# Patient Record
Sex: MALE | Race: WHITE | NOT HISPANIC OR LATINO | URBAN - METROPOLITAN AREA
[De-identification: names, ages, dates, MRNs, and addresses within clinical notes are randomized per-mention and may not be internally consistent; named-entity substitution may affect disease eponyms.]

---
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
## 2024-03-27 ENCOUNTER — HOSPITAL ENCOUNTER (OUTPATIENT)
Dept: RADIOLOGY | Facility: MEDICAL CENTER | Age: 19
End: 2024-03-27
Attending: NURSE PRACTITIONER
Payer: COMMERCIAL

## 2024-03-27 ENCOUNTER — OCCUPATIONAL MEDICINE (OUTPATIENT)
Dept: URGENT CARE | Facility: PHYSICIAN GROUP | Age: 19
End: 2024-03-27
Payer: COMMERCIAL

## 2024-03-27 VITALS
HEIGHT: 69 IN | DIASTOLIC BLOOD PRESSURE: 72 MMHG | RESPIRATION RATE: 16 BRPM | WEIGHT: 178 LBS | OXYGEN SATURATION: 99 % | HEART RATE: 84 BPM | BODY MASS INDEX: 26.36 KG/M2 | SYSTOLIC BLOOD PRESSURE: 126 MMHG | TEMPERATURE: 97.9 F

## 2024-03-27 DIAGNOSIS — S93.402A SPRAIN OF LEFT ANKLE, UNSPECIFIED LIGAMENT, INITIAL ENCOUNTER: ICD-10-CM

## 2024-03-27 DIAGNOSIS — S99.912A INJURY OF LEFT ANKLE, INITIAL ENCOUNTER: ICD-10-CM

## 2024-03-27 PROCEDURE — 99203 OFFICE O/P NEW LOW 30 MIN: CPT | Performed by: NURSE PRACTITIONER

## 2024-03-27 PROCEDURE — 3078F DIAST BP <80 MM HG: CPT | Performed by: NURSE PRACTITIONER

## 2024-03-27 PROCEDURE — 3074F SYST BP LT 130 MM HG: CPT | Performed by: NURSE PRACTITIONER

## 2024-03-27 PROCEDURE — 73610 X-RAY EXAM OF ANKLE: CPT | Mod: LT

## 2024-03-27 NOTE — LETTER
[facility]  [address] [place] [address]  Phone:  [phone] - Fax:  [phone]   [facility] Progress Report and Disability Certification  Date of Service: 3/27/2024   No Show:  No  Date / Time of Next Visit: 3/31/2024   Claim Information   Patient Name: Qamar Felix  Claim Number:     Employer: [employer]  Date of Injury: 3/27/2024     Insurer / TPA: [employer]  ID / SSN:     Occupation: [employer]  Diagnosis: Diagnoses of Sprain of left ankle, unspecified ligament, initial encounter and Injury of left ankle, initial encounter were pertinent to this visit.    Medical Information   Related to Industrial Injury? Yes    Subjective Complaints:  DOI 3/27/24 VINEET: Rolled left ankle while carrying object     Initial visit: 3/27/2024.  Patient presents to clinic for workplace injury of the left ankle.  Patient states he was walking while carrying an object and he did step on a rock which caused him to twist his ankle.  He had pain immediately after injury and he was unable to ambulate.  Currently has pain to lateral ankle.  He has not taken anything for the pain.  No previous injury he does not have a second job.   Objective Findings: Physical Exam  Constitutional:       Appearance: Normal appearance.   Musculoskeletal:      Left lower leg: Normal.      Left ankle: Swelling present. Tenderness present over the lateral malleolus. Decreased range of motion. Anterior drawer test negative. Normal pulse.      Left Achilles Tendon: Normal.      Left foot: Normal range of motion and normal capillary refill. No tenderness, bony tenderness or crepitus. Normal pulse.   Neurological:      Mental Status: He is alert.        Pre-Existing Condition(s):     Assessment:   Initial Visit    Status: Additional Care Required  Permanent Disability:No    Plan: Medication    Diagnostics: X-ray  Comments:No radiographic evidence of acute traumatic injury.    Comments:        Disability Information   Status: Released to Restricted Duty    From:  3/27/2024  Through: 3/31/2024 Restrictions are: Temporary   Physical Restrictions   Sitting:    Standing:  < or = to 1 hr/day Stoopin hrs/day Bendin hrs/day   Squattin hrs/day Walking:  < or = to 1 hr/day Climbin hrs/day Pushin hrs/day   Pullin hrs/day Other:    Reaching Above Shoulder (L):   Reaching Above Shoulder (R):       Reaching Below Shoulder (L):    Reaching Below Shoulder (R):      Not to exceed Weight Limits   Carrying(hrs):   Weight Limit(lb):   Lifting(hrs):   Weight  Limit(lb):     Comments: Advised alternating Tylenol/ibuprofen as needed for pain.  Ice for 10-15 minutes 3-4 times daily    Walking boot   Desk or sedentary work        Repetitive Actions   Hands: i.e. Fine Manipulations from Grasping:     Feet: i.e. Operating Foot Controls: 0 hrs/day   Driving / Operate Machinery: 0 hrs/day   Health Care Provider’s Original or Electronic Signature  [facility] Provider’s Original or Electronic Signature    Carl Mcleod DO MPH     Clinic Name / Location: [address] [address] Clinic Phone Number: Dept: [phone]   Appointment Time: 5:55 Pm Visit Start Time: 6:19 PM   Check-In Time:  6:16 Pm Visit Discharge Time:  7:00 PM   Original-Treating Physician or Chiropractor    Page 2-Insurer/TPA    Page 3-Employer    Page 4-Employee

## 2024-03-27 NOTE — LETTER
"    EMPLOYEE’S CLAIM FOR COMPENSATION/ REPORT OF INITIAL TREATMENT  FORM C-4  PLEASE TYPE OR PRINT    EMPLOYEE’S CLAIM - PROVIDE ALL INFORMATION REQUESTED   First Name                    MARCELL Foote Last Name  Isidro Birthdate                    2005                Sex  []M  []F Claim Number (Insurer’s Use Only)     Home Address  [address] [place] [address] Age  18 y.o. Height  1.753 m (5' 9\") Weight  80.7 kg (178 lb) Social Security Number     [facility] Zip  [address] Telephone  [phone] (home)    Mailing Address  [address] [place] [address] [facility] Zip  [address] Primary Language Spoken  English    INSURER   THIRD-PARTY   [employer]   Employee's Occupation (Job Title) When Injury or Occupational Disease Occurred  [employer]    Employer's Name/Company Name  [employer]  Telephone  [phone]    Office Mail Address (Number and Street)  [address]     Date of Injury (if applicable) 3/27/2024               Hours Injury (if applicable)  4:20 PM Date Employer Notified  3/27/2024 Last Day of Work after Injury or Occupational Disease  3/27/2024 Supervisor to Whom Injury     Reported     Address or Location of Accident (if applicable)  Work [1]   What were you doing at the time of accident? (if applicable)  Walking carying object    How did this injury or occupational disease occur? (Be specific and answer in detail. Use additional sheet if necessary)  While walking and a rock twisted my leg   If you believe that you have an occupational disease, when did you first have knowledge of the disability and its relationship to your employment?  N/A Witnesses to the Accident (if applicable)  N/A      Nature of Injury or Occupational Disease  Workers' Compensation  Part(s) of Body Injured or Affected  Ankle (L) N/A N/A    I CERTIFY THAT THE ABOVE IS TRUE AND CORRECT TO T " HE BEST OF MY KNOWLEDGE AND THAT I HAVE PROVIDED THIS INFORMATION IN ORDER TO OBTAIN THE BENEFITS OF [place], OR CHAPTER [place]).  I HEREBY AUTHORIZE ANY PHYSICIAN, CHIROPRACTOR, SURGEON, PRACTITIONER OR ANY OTHER PERSON, ANY HOSPITAL, INCLUDING [facility] OR [facility], ANY  MEDICAL SERVICE ORGANIZATION, ANY INSURANCE COMPANY, OR OTHER INSTITUTION OR ORGANIZATION TO RELEASE TO EACH OTHER, ANY MEDICAL OR OTHER INFORMATION, INCLUDING BENEFITS PAID OR PAYABLE, PERTINENT TO THIS INJURY OR DISEASE, EXCEPT INFORMATION RELATIVE TO DIAGNOSIS, TREATMENT AND/OR COUNSELING FOR AIDS, PSYCHOLOGICAL CONDITIONS, ALCOHOL OR CONTROLLED SUBSTANCES, FOR WHICH I MUST GIVE SPECIFIC AUTHORIZATION.  A PHOTOSTAT OF THIS AUTHORIZATION SHALL BE VALID AS THE ORIGINAL.     Date   Place Employee’s Original or  *Electronic Signature   THIS REPORT MUST BE COMPLETED AND MAILED WITHIN 3 WORKING DAYS OF TREATMENT   Place  [facility]    Name of Facility  [place]   Date 3/27/2024 Diagnosis and Description of Injury or Occupational Disease  (S93.402A) Sprain of left ankle, unspecified ligament, initial encounter  (S99.912A) Injury of left ankle, initial encounter  Diagnoses of Sprain of left ankle, unspecified ligament, initial encounter and Injury of left ankle, initial encounter were pertinent to this visit. Is there evidence that the injured employee was under the influence of alcohol and/or another controlled substance at the time of accident?  []No  [] Yes (if yes, please explain)   Hour 6:19 PM  No   Treatment: Rest, Ice, elevation, walking boot.     Have you advised the patient to remain off work five days or more?   [] Yes Indicate dates: From   To    []No If no, is the injured employee capable of: [] full duty [] modified duty                                                             No  Yes  If modified duty, specify any  limitations / restrictions:  See d-39                                                                                                                                                                                                                                                                                                                                                                                                               X-Ray Findings: Negative    From information given by the employee, together with medical evidence, can you directly connect this injury or occupational disease as job incurred?  []Yes   [] No Yes    Is additional medical care by a physician indicated? []Yes [] No  Yes    Do you know of any previous injury or disease contributing to this condition or occupational disease? []Yes [] No (Explain if yes)                          No   Date  3/27/2024 Print Health Care Provider’s Name  ASAD Orlando I certify that the employer’s copy of  this form was delivered to the employer on:   Address  [address] INSURER'S USE ONLY                       [facility] [place] [facility] Zip  [address] Provider’s Tax ID Number  [id number]   Telephone  Dept: [phone]    Health Care Provider’s Original or Electronic Signature  e-SignFRANCES STONE Degree (MD,DO, DC,PA-C,APRN)  APRN  Choose (if applicable)      ORIGINAL - TREATING HEALTHCARE PROVIDER PAGE 2 - INSURER/TPA PAGE 3 - EMPLOYER PAGE 4 - EMPLOYEE             Form C-4 (rev.08/23)        BRIEF DESCRIPTION OF RIGHTS AND BENEFITS  (Pursuant to [place] 616C.050)    Notice of Injury or Occupational Disease (Incident Report Form C-1): If an injury or occupational disease (OD) arises out of and in the course of employment, you must provide written notice to your employer as soon as practicable, but no later than 7 days after the accident or OD. Your employer shall maintain a sufficient supply of the required forms.    Claim for Compensation  "(Form C-4): If medical treatment is sought, the form C-4 is available at the place of initial treatment. A completed \"Claim for Compensation\" (Form C-4) must be filed within 90 days after an accident or OD. The treating physician or chiropractor must, within 3 working days after treatment, complete and mail to the employer, the employer's insurer and third-party , the Claim for Compensation.    Medical Treatment: If you require medical treatment for your on-the-job injury or OD, you may be required to select a physician or chiropractor from a list provided by your workers’ compensation insurer, if it has contracted with an Organization for Managed Care (MCO) or Preferred Provider Organization (PPO) or providers of health care. If your employer has not entered into a contract with an MCO or PPO, you may select a physician or chiropractor from the Panel of Physicians and Chiropractors. Any medical costs related to your industrial injury or OD will be paid by your insurer.    Temporary Total Disability (TTD): If your doctor has certified that you are unable to work for a period of at least 5 consecutive days, or 5 cumulative days in a 20-day period, or places restrictions on you that your employer does not accommodate, you may be entitled to TTD compensation.    Temporary Partial Disability (TPD): If the wage you receive upon reemployment is less than the compensation for TTD to which you are entitled, the insurer may be required to pay you TPD compensation to make up the difference. TPD can only be paid for a maximum of 24 months.    Permanent Partial Disability (PPD): When your medical condition is stable and there is an indication of a PPD as a result of your injury or OD, within 30 days, your insurer must arrange for an evaluation by a rating physician or chiropractor to determine the degree of your PPD. The amount of your PPD award depends on the date of injury, the results of the PPD evaluation, your " age and wage.    Permanent Total Disability (PTD): If you are medically certified by a treating physician or chiropractor as permanently and totally disabled and have been granted a PTD status by your insurer, you are entitled to receive monthly benefits not to exceed 66 2/3% of your average monthly wage. The amount of your PTD payments is subject to reduction if you previously received a lump-sum PPD award.    Vocational Rehabilitation Services: You may be eligible for vocational rehabilitation services if you are unable to return to the job due to a permanent physical impairment or permanent restrictions as a result of your injury or occupational disease.    Transportation and Per Nicolle Reimbursement: You may be eligible for travel expenses and per nicolle associated with medical treatment.    Reopening: You may be able to reopen your claim if your condition worsens after claim closure.     Appeal Process: If you disagree with a written determination issued by the insurer or the insurer does not respond to your request, you may appeal to the Department of Administration, , by following the instructions contained in your determination letter. You must appeal the determination within 70 days from the date of the determination letter at [address], [address], [place], [place] [address], or [address] [facility] [address], [place] [address]. If you disagree with the  decision, you may appeal to the Department of Administration, . You must file your appeal within 30 days from the date of the  decision letter at [address], [address], [place], [place] [address], or [address] [facility] [address], [place] [address]. If you disagree with a decision of an , you may file a petition for judicial review with the District Court. You must do so within 30 days of the Appeal Officer’s decision. You may be represented by an   at your own expense or you may contact the [facility] for possible representation.    [place]  for Injured Workers (NAIW): If you disagree with a  decision, you may request that NAIW represent you without charge at an  Hearing. For information regarding denial of benefits, you may contact the [facility] at: [address], [address], [place] [address], [phone], or [address], [place] [address], [phone]    To File a Complaint with the Division: If you wish to file a complaint with the  of the Division of Industrial Relations (DIR),  please contact the Workers’ Compensation Section, [address], [address], [place], [place] [address], telephone [phone], or [address], [address], [place], [place] [address], telephone [phone].    For assistance with Workers’ Compensation Issues: You may contact the [facility] Office for Consumer Health Assistance, [address], [address], [place], [place] [address], Toll Free 1-633.204.6502, Web site: [email or web address].[email or web address].gov/Programs/KAYLA E-mail: [email or web address]              __________________________________________________________________                                    _________________            Employee Name / Signature                                                                                                                            Date                                                                                                                                                                                                                              D-2 (rev. 10/20)

## 2024-03-28 NOTE — PROGRESS NOTES
"Subjective:     Qamar Felix is a 18 y.o. male who presents for Ankle Injury (New  left ankle pain )      DOI 3/27/24 VINEET: Rolled left ankle while carrying object     Initial visit: 3/27/2024.  Patient presents to clinic for workplace injury of the left ankle.  Patient states he was walking while carrying an object and he did step on a rock which caused him to twist his ankle.  He had pain immediately after injury and he was unable to ambulate.  Currently has pain to lateral ankle.  He has not taken anything for the pain.  No previous injury he does not have a second job.    PMH:   No pertinent past medical history to this problem  MEDS:  Medications were reviewed in EMR  ALLERGIES:  Allergies were reviewed in EMR  FH:   No pertinent family history to this problem       Objective:     /72   Pulse 84   Temp 36.6 °C (97.9 °F)   Resp 16   Ht 1.753 m (5' 9\")   Wt 80.7 kg (178 lb)   SpO2 99%   BMI 26.29 kg/m²     Physical Exam  Constitutional:       Appearance: Normal appearance.   Musculoskeletal:      Left lower leg: Normal.      Left ankle: Swelling present. Tenderness present over the lateral malleolus. Decreased range of motion. Anterior drawer test negative. Normal pulse.      Left Achilles Tendon: Normal.      Left foot: Normal range of motion and normal capillary refill. No tenderness, bony tenderness or crepitus. Normal pulse.   Neurological:      Mental Status: He is alert.       DX-ANKLE 3+ VIEWS LEFT    Result Date: 3/27/2024  3/27/2024 6:31 PM    No radiographic evidence of acute traumatic injury.       Diagnostic interpreted by myself.    Assessment/Plan:     Pleasant 18-year-old male presenting clinic with workplace injury of the left ankle.  Fortunately x-ray is negative.  Patient is placed in a walking boot advised he must wear this while ambulating.  He is able to walk mostly pain-free we will defer crutches for now.  Patient will follow-up in 4 days time for reevaluation.  " Strict work restrictions at this time.    1. Sprain of left ankle, unspecified ligament, initial encounter    2. Injury of left ankle, initial encounter  - DX-ANKLE 3+ VIEWS LEFT    Released to Restricted Duty FROM 3/27/2024 TO 3/31/2024  Advised alternating Tylenol/ibuprofen as needed for pain.  Ice for 10-15 minutes 3-4 times daily    Walking boot   Desk or sedentary work           Differential diagnosis, natural history, supportive care, and indications for immediate follow-up discussed.

## 2024-03-31 ENCOUNTER — OCCUPATIONAL MEDICINE (OUTPATIENT)
Dept: URGENT CARE | Facility: PHYSICIAN GROUP | Age: 19
End: 2024-03-31
Payer: COMMERCIAL

## 2024-03-31 VITALS
WEIGHT: 178 LBS | HEIGHT: 69 IN | TEMPERATURE: 97.9 F | DIASTOLIC BLOOD PRESSURE: 60 MMHG | OXYGEN SATURATION: 98 % | BODY MASS INDEX: 26.36 KG/M2 | RESPIRATION RATE: 14 BRPM | SYSTOLIC BLOOD PRESSURE: 116 MMHG | HEART RATE: 60 BPM

## 2024-03-31 DIAGNOSIS — S93.602D FOOT SPRAIN, LEFT, SUBSEQUENT ENCOUNTER: ICD-10-CM

## 2024-03-31 ASSESSMENT — VISUAL ACUITY: OU: 1

## 2024-03-31 ASSESSMENT — ENCOUNTER SYMPTOMS
NEUROLOGICAL NEGATIVE: 1
CONSTITUTIONAL NEGATIVE: 1

## 2024-03-31 NOTE — PROGRESS NOTES
"Subjective:     Qamar Felix is a 18 y.o. male who presents for Follow-Up (Workers Comp [employer] Lft foot injury)    Initial UC visit 3/27/2024 reviewed for continuity of care:    \"Initial visit: 3/27/2024.  Patient presents to clinic for workplace injury of the left ankle.  Patient states he was walking while carrying an object and he did step on a rock which caused him to twist his ankle.  He had pain immediately after injury and he was unable to ambulate.  Currently has pain to lateral ankle.  He has not taken anything for the pain.  No previous injury he does not have a second job.\"    XR negative. Dx: L ankle sprain. Tx: Walking boot, restrictions, RICE, Tylenol, ibuprofen if needed.    Follow-up UC visit today 3/31/2024:    Patient reports condition gradually improving. Still has some pain, swelling, tenderness, and bruising in front of the left lateral ankle. Working within restrictions. Wearing boot. Using ibuprofen as needed. No other/new symptoms.    Review of Systems   Constitutional: Negative.    Musculoskeletal:         L foot/ankle pain   Neurological: Negative.    All other systems reviewed and are negative.    Refer to HPI for additional details.    During this visit, appropriate PPE was worn and hand hygiene was performed.    PMH: No pertinent past medical history to this problem  MEDS: Medications were reviewed in Epic  ALLERGIES: Allergies were reviewed in Epic  SOCHX: Works as a logistic yard at [employer]   FH: No pertinent family history to this problem      Objective:     /60   Pulse 60   Temp 36.6 °C (97.9 °F) (Temporal)   Resp 14   Ht 1.753 m (5' 9\")   Wt 80.7 kg (178 lb)   SpO2 98%   BMI 26.29 kg/m²     Physical Exam  Nursing note reviewed.   Constitutional:       General: He is not in acute distress.     Appearance: He is well-developed. He is not ill-appearing or toxic-appearing.   Eyes:      General: Vision grossly intact.   Cardiovascular:      Rate and Rhythm: Normal " rate.      Pulses: Normal pulses.   Pulmonary:      Effort: Pulmonary effort is normal. No respiratory distress.   Musculoskeletal:         General: No deformity. Normal range of motion.      Left ankle: No swelling, deformity, ecchymosis or lacerations. Normal range of motion. Anterior drawer test negative.      Left Achilles Tendon: No defects.      Left foot: Normal range of motion and normal capillary refill. Swelling and tenderness present. No deformity or laceration. Normal pulse.        Feet:       Comments: Swelling, ecchymosis, TTP at lateral left midfoot distal to lateral malleolus   Skin:     General: Skin is warm and dry.      Capillary Refill: Capillary refill takes less than 2 seconds.      Coloration: Skin is not pale.      Findings: No bruising.   Neurological:      Mental Status: He is alert and oriented to person, place, and time.      Motor: No weakness.      Gait: Gait abnormal (Antalgic, wearing boot).   Psychiatric:         Behavior: Behavior normal. Behavior is cooperative.       Assessment/Plan:     1. Foot sprain, left, subsequent encounter    Condition gradually improving.  Update work restrictions.  May operate work vehicle as climbing is not required and foot controls are for the right foot.  Continue previously recommended therapy including using boot while ambulating. Follow up in 7 days.    Differential diagnosis, natural history, supportive care, over-the-counter symptom management per 's instructions, close monitoring, and indications for immediate follow-up discussed.     All questions answered. Patient agrees with the plan of care.    Discharge summary provided via [employer].

## 2024-03-31 NOTE — LETTER
"   [facility]  [address]  [place] [address]  Phone:  [phone] - Fax:  [phone]   [facility] Progress Report and Disability Certification  Date of Service: 3/31/2024   No Show:  No  Date / Time of Next Visit: 4/7/2024   Claim Information   Patient Name: Qamar Felix  Claim Number:     Employer: [employer]  Date of Injury: 3/27/2024     Insurer / TPA: [employer]  ID / SSN:     Occupation: [employer]  Diagnosis: The encounter diagnosis was Foot sprain, left, subsequent encounter.    Medical Information   Related to Industrial Injury? Yes    Subjective Complaints:  Initial UC visit 3/27/2024 reviewed for continuity of care:    \"Initial visit: 3/27/2024.  Patient presents to clinic for workplace injury of the left ankle.  Patient states he was walking while carrying an object and he did step on a rock which caused him to twist his ankle.  He had pain immediately after injury and he was unable to ambulate.  Currently has pain to lateral ankle.  He has not taken anything for the pain.  No previous injury he does not have a second job.\"    XR negative. Dx: L ankle sprain. Tx: Walking boot, restrictions, RICE, Tylenol, ibuprofen if needed.    Follow-up UC visit today 3/31/2024:    Patient reports condition gradually improving. Still has some pain, swelling, tenderness, and bruising in front of the left lateral ankle. Working within restrictions. Wearing boot. Using ibuprofen as needed. No other/new symptoms.   Objective Findings: Constitutional:       General: He is not in acute distress.     Appearance: He is well-developed. He is not ill-appearing or toxic-appearing.   Musculoskeletal:         General: No deformity. Normal range of motion.      Left ankle: No swelling, deformity, ecchymosis or lacerations. Normal range of motion. Anterior drawer test negative.      Left Achilles Tendon: No defects.      Left foot: Normal range of motion and " normal capillary refill. Swelling and tenderness present. No deformity or laceration. Normal pulse.        Feet:        Comments: Swelling, ecchymosis, TTP at lateral left midfoot distal to lateral malleolus   Skin:     General: Skin is warm and dry.      Capillary Refill: Capillary refill takes less than 2 seconds.      Coloration: Skin is not pale.      Findings: No bruising.   Neurological:      Mental Status: He is alert and oriented to person, place, and time.      Motor: No weakness.      Gait: Gait abnormal (Antalgic, wearing boot).   Pre-Existing Condition(s):     Assessment:   Condition Improved    Status: Additional Care Required  Permanent Disability:No    Plan:      Diagnostics:      Comments:  Condition gradually improving.  Update work restrictions.  May operate work vehicle as climbing is not required and foot controls are for the right foot.  Continue previously recommended therapy including using boot while ambulating.    Disability Information   Status: Released to Restricted Duty    From:  3/31/2024  Through: 2024 Restrictions are: Temporary   Physical Restrictions   Sitting:    Standing:  < or = to 1 hr/day Stooping:    Bending:      Squatting:    Walking:  < or = to 1 hr/day Climbin hrs/day Pushing:      Pulling:    Other:    Reaching Above Shoulder (L):   Reaching Above Shoulder (R):       Reaching Below Shoulder (L):    Reaching Below Shoulder (R):      Not to exceed Weight Limits   Carrying(hrs):   Weight Limit(lb):   Lifting(hrs):   Weight  Limit(lb):     Comments: Continue to wear walking boot while ambulating and standing, may operate work vehicle with right foot controls    Repetitive Actions   Hands: i.e. Fine Manipulations from Grasping:     Feet: i.e. Operating Foot Controls:     Driving / Operate Machinery:     Health Care Provider’s Original or Electronic Signature  ASAD Mancuso Health Care Provider’s Original or Electronic Signature    Carl Mcleod DO  MPH     Clinic Name / Location: [facility] [place] [address] Clinic Phone Number: Dept: [phone]   Appointment Time: 9:30 Am Visit Start Time: 9:31 AM   Check-In Time:  9:23 Am Visit Discharge Time:  9:53 AM   Original-Treating Physician or Chiropractor    Page 2-Insurer/TPA    Page 3-Employer    Page 4-Employee

## 2024-04-07 ENCOUNTER — OCCUPATIONAL MEDICINE (OUTPATIENT)
Dept: URGENT CARE | Facility: PHYSICIAN GROUP | Age: 19
End: 2024-04-07
Payer: COMMERCIAL

## 2024-04-07 VITALS
WEIGHT: 178 LBS | BODY MASS INDEX: 25.48 KG/M2 | HEIGHT: 70 IN | OXYGEN SATURATION: 98 % | DIASTOLIC BLOOD PRESSURE: 60 MMHG | HEART RATE: 62 BPM | SYSTOLIC BLOOD PRESSURE: 114 MMHG | TEMPERATURE: 97.5 F | RESPIRATION RATE: 16 BRPM

## 2024-04-07 DIAGNOSIS — S93.602D FOOT SPRAIN, LEFT, SUBSEQUENT ENCOUNTER: ICD-10-CM

## 2024-04-07 PROCEDURE — 99213 OFFICE O/P EST LOW 20 MIN: CPT | Performed by: NURSE PRACTITIONER

## 2024-04-07 PROCEDURE — 3074F SYST BP LT 130 MM HG: CPT | Performed by: NURSE PRACTITIONER

## 2024-04-07 PROCEDURE — 3078F DIAST BP <80 MM HG: CPT | Performed by: NURSE PRACTITIONER

## 2024-04-07 ASSESSMENT — ENCOUNTER SYMPTOMS
CHILLS: 0
MYALGIAS: 0
FEVER: 0

## 2024-04-07 NOTE — LETTER
[facility]  [address]  [place] [address]  Phone:  [phone] - Fax:  [phone]   [facility] Progress Report and Disability Certification  Date of Service: 4/7/2024   No Show:  No  Date / Time of Next Visit: 4/11/2024   Claim Information   Patient Name: Qamar Felix  Claim Number:     Employer: [employer]  Date of Injury: 3/27/2024     Insurer / TPA: [employer]  ID / SSN:     Occupation: [employer]  Diagnosis: The encounter diagnosis was Foot sprain, left, subsequent encounter.    Medical Information   Related to Industrial Injury? Yes    Subjective Complaints:  \FOLLOW UP #2. Patient is 18 year old male here for follow up on left foot/ankle sprain after rolling it on rock. Currently doing well. No medications and walking without boot. States feels much better. No second job. No prior history with this ankle;/foot.     Objective Findings: Physical Exam  Constitutional:       Appearance: Normal appearance. He is not ill-appearing.   Cardiovascular:      Rate and Rhythm: Normal rate and regular rhythm.   Pulmonary:      Effort: Pulmonary effort is normal.      Breath sounds: Normal breath sounds.   Musculoskeletal:         General: Normal range of motion.   Skin:     General: Skin is warm and dry.      Capillary Refill: Capillary refill takes less than 2 seconds.      Findings: No bruising or erythema.   Neurological:      General: No focal deficit present.      Mental Status: He is alert and oriented to person, place, and time.        Pre-Existing Condition(s):     Assessment:   Condition Improved    Status: Additional Care Required  Permanent Disability:No    Plan:      Diagnostics:      Comments:       Disability Information   Status: Released to Full Duty    From:  4/7/2024  Through: 4/11/2024 Restrictions are: Temporary   Physical Restrictions   Sitting:    Standing:    Stooping:    Bending:      Squatting:    Walking:    Climbing:     Pushing:      Pulling:    Other:    Reaching Above Shoulder (L):   Reaching Above Shoulder (R):       Reaching Below Shoulder (L):    Reaching Below Shoulder (R):      Not to exceed Weight Limits   Carrying(hrs):   Weight Limit(lb):   Lifting(hrs):   Weight  Limit(lb):     Comments:      Repetitive Actions   Hands: i.e. Fine Manipulations from Grasping:     Feet: i.e. Operating Foot Controls:     Driving / Operate Machinery:     Health Care Provider’s Original or Electronic Signature  Declan Robledo A.P.N. Health Care Provider’s Original or Electronic Signature    Carl Mcleod DO MPH     Clinic Name / Location: [address] [address] Clinic Phone Number: Dept: [phone]   Appointment Time: 10:00 Am Visit Start Time: 10:32 AM   Check-In Time:  10:26 Am Visit Discharge Time:  10:59 AM   Original-Treating Physician or Chiropractor    Page 2-Insurer/TPA    Page 3-Employer    Page 4-Employee

## 2024-04-07 NOTE — PROGRESS NOTES
"Subjective     Qamar Felix is a 18 y.o. male who presents with Follow-Up (Workers comp [employer] foot injury)            HPIFOLLOW UP #2. Patient is 18 year old male here for follow up on left foot/ankle sprain after rolling it on rock. Currently doing well. No medications and walking without boot. States feels much better. No second job. No prior history with this ankle;/foot.    Patient has no known allergies.  No current outpatient medications on file prior to visit.     No current facility-administered medications on file prior to visit.     Social History     Socioeconomic History    Marital status: Single     Spouse name: Not on file    Number of children: Not on file    Years of education: Not on file    Highest education level: Not on file   Occupational History    Not on file   Tobacco Use    Smoking status: Never    Smokeless tobacco: Not on file   Vaping Use    Vaping Use: Never used   Substance and Sexual Activity    Alcohol use: Never    Drug use: Never    Sexual activity: Not on file   Other Topics Concern    Not on file   Social History Narrative    Not on file     Social Determinants of Health     Financial Resource Strain: Not on file   Food Insecurity: Not on file   Transportation Needs: Not on file   Physical Activity: Not on file   Stress: Not on file   Social Connections: Not on file   Intimate Partner Violence: Not on file   Housing Stability: Not on file     Breast Cancer-related family history is not on file.      Review of Systems   Constitutional:  Negative for chills and fever.   Musculoskeletal:  Negative for joint pain and myalgias.   Skin: Negative.               Objective     /60   Pulse 62   Temp 36.4 °C (97.5 °F) (Temporal)   Resp 16   Ht 1.778 m (5' 10\")   Wt 80.7 kg (178 lb)   SpO2 98%   BMI 25.54 kg/m²      Physical Exam  Constitutional:       Appearance: Normal appearance. He is not ill-appearing.   Cardiovascular:      Rate and Rhythm: Normal rate and " regular rhythm.   Pulmonary:      Effort: Pulmonary effort is normal.      Breath sounds: Normal breath sounds.   Musculoskeletal:         General: Normal range of motion.   Skin:     General: Skin is warm and dry.      Capillary Refill: Capillary refill takes less than 2 seconds.      Findings: No bruising or erythema.   Neurological:      General: No focal deficit present.      Mental Status: He is alert and oriented to person, place, and time.                             Assessment & Plan        1. Foot sprain, left, subsequent encounter          Full duty  Follow up 5 days.  Expect MMI after that appt.

## 2024-04-11 ENCOUNTER — OCCUPATIONAL MEDICINE (OUTPATIENT)
Dept: URGENT CARE | Facility: PHYSICIAN GROUP | Age: 19
End: 2024-04-11
Payer: COMMERCIAL

## 2024-04-11 VITALS
HEIGHT: 69 IN | WEIGHT: 176 LBS | OXYGEN SATURATION: 96 % | TEMPERATURE: 98.1 F | RESPIRATION RATE: 20 BRPM | DIASTOLIC BLOOD PRESSURE: 80 MMHG | BODY MASS INDEX: 26.07 KG/M2 | HEART RATE: 61 BPM | SYSTOLIC BLOOD PRESSURE: 124 MMHG

## 2024-04-11 DIAGNOSIS — S93.602D FOOT SPRAIN, LEFT, SUBSEQUENT ENCOUNTER: ICD-10-CM

## 2024-04-11 PROCEDURE — 3079F DIAST BP 80-89 MM HG: CPT | Performed by: REGISTERED NURSE

## 2024-04-11 PROCEDURE — 3074F SYST BP LT 130 MM HG: CPT | Performed by: REGISTERED NURSE

## 2024-04-11 PROCEDURE — 99213 OFFICE O/P EST LOW 20 MIN: CPT | Performed by: REGISTERED NURSE

## 2024-04-11 NOTE — LETTER
[facility]                       PROGRESS REPORT  CERTIFICATION OF DISABILITY Claim Number:        Social Security Number:     Patient’s Name:Qamar Felix Date of Injury:  3/27/2024     Employer:  [employer]  Name of O (if applicable)   Patient’s Job Description/Occupation:  [employer]    Previous Injuries/Diseases/Surgeries Contributing to the Condition:      Diagnosis:  The encounter diagnosis was Foot sprain, left, subsequent encounter.   Related to the Industrial Injury? Explain:    YES   Objective Medical Findings:  NORMAL GAIT  NO LEFT ANKLE/FOOT TENDERNESS  NORMAL LEFT ANKLE/FOOT ROM    [x]  None - Discharged                         Stable     []  Yes     []  No                           Ratable     []  Yes     []  No   []  Generally Improved                        []  Condition Worsened                              []  Condition Same         May Have Suffered a Permanent Disability     []  Yes     []  No   Treatment Plan:       [] No Change in Therapy                    [] PT/OT Prescribed                   [] Medication May be Used While Working    [] Case Management                          [] PT/OT Discontinued  []  Consultation    [] Further Diagnostic Studies    []  Prescription(s)                        [x] Released to FULL DUTY /No Restrictions on (Date):                                                                                           [] Certified TOTALLY TEMPORARILY DISABLED (Indicate Dates): From:                       To:                               [] Released to RESTRICTED/Modified Duty on (Date): From:                              To:                                                                   Restrictions Are:     []  Permanent[]  Temporary   [] No Sitting                   []  No Standing          []  No Pulling             []  Other:                                              [] No Bending at Waist   []  No Stooping          []  No Lifting                                                                            [] No Carrying                []  No Walking           []  Lifting Restricted to (lbs.):   [] No Pushing                 []  No Climbing         []  No Reaching Above Shoulders   Date of Next Visit:   Date of this Exam:  4/11/2024 Physician/Chiropractic Physician Name: ASAD Larson Physician/Chiropractic Physician Signature:   Carl Mcleod DO MPH   [id number] (Rev. 2/24)

## 2024-04-12 NOTE — PROGRESS NOTES
"Subjective:     Qamar Felix is a 18 y.o. male who presents for Follow-Up (WORKERS COMP Foot sprain, left)      Visit #4  Percent improved: 100%  Treatment: NONE  Restrictions: FULL DUTY    NO ISSUES AT WORK WITH FULL DUTY. NO NUMBNESS OR TINGLING. NO NEW INJURIES. NOT USING OTC MEDICATIONS      PMH:   No pertinent past medical history to this problem  MEDS:  Medications were reviewed in EMR  ALLERGIES:  Allergies were reviewed in EMR  SOCHX:  Works at [employer]  FH:   No pertinent family history to this problem       Objective:     /80   Pulse 61   Temp 36.7 °C (98.1 °F) (Temporal)   Resp 20   Ht 1.753 m (5' 9\")   Wt 79.8 kg (176 lb)   SpO2 96%   BMI 25.99 kg/m²     NORMAL GAIT  NO LEFT ANKLE/FOOT TENDERNESS  NORMAL LEFT ANKLE/FOOT ROM    Assessment/Plan:       1. Foot sprain, left, subsequent encounter    Released to Full Duty FROM   TO    [facility] WITH 100% IMPROVEMENT.       Differential diagnosis, natural history, supportive care, and indications for immediate follow-up discussed.  "

## 2024-08-26 ENCOUNTER — OFFICE VISIT (OUTPATIENT)
Dept: MEDICAL GROUP | Facility: MEDICAL CENTER | Age: 19
End: 2024-08-26
Payer: COMMERCIAL

## 2024-08-26 ENCOUNTER — HOSPITAL ENCOUNTER (OUTPATIENT)
Dept: LAB | Facility: MEDICAL CENTER | Age: 19
End: 2024-08-26
Attending: FAMILY MEDICINE
Payer: COMMERCIAL

## 2024-08-26 ENCOUNTER — HOSPITAL ENCOUNTER (OUTPATIENT)
Facility: MEDICAL CENTER | Age: 19
End: 2024-08-26
Attending: FAMILY MEDICINE
Payer: COMMERCIAL

## 2024-08-26 VITALS
WEIGHT: 190 LBS | SYSTOLIC BLOOD PRESSURE: 122 MMHG | HEIGHT: 69 IN | DIASTOLIC BLOOD PRESSURE: 64 MMHG | TEMPERATURE: 98.2 F | OXYGEN SATURATION: 96 % | BODY MASS INDEX: 28.14 KG/M2 | HEART RATE: 60 BPM

## 2024-08-26 DIAGNOSIS — Z11.3 SCREENING EXAMINATION FOR STD (SEXUALLY TRANSMITTED DISEASE): ICD-10-CM

## 2024-08-26 DIAGNOSIS — Z00.00 ENCOUNTER FOR MEDICAL EXAMINATION TO ESTABLISH CARE: ICD-10-CM

## 2024-08-26 DIAGNOSIS — N48.1 BALANITIS: ICD-10-CM

## 2024-08-26 DIAGNOSIS — Z11.59 ENCOUNTER FOR HEPATITIS C SCREENING TEST FOR LOW RISK PATIENT: ICD-10-CM

## 2024-08-26 PROBLEM — N48.9 LESION OF PENIS: Status: ACTIVE | Noted: 2024-08-26

## 2024-08-26 PROCEDURE — 87591 N.GONORRHOEAE DNA AMP PROB: CPT

## 2024-08-26 PROCEDURE — 36415 COLL VENOUS BLD VENIPUNCTURE: CPT

## 2024-08-26 PROCEDURE — 86694 HERPES SIMPLEX NES ANTBDY: CPT

## 2024-08-26 PROCEDURE — 87661 TRICHOMONAS VAGINALIS AMPLIF: CPT

## 2024-08-26 PROCEDURE — 87389 HIV-1 AG W/HIV-1&-2 AB AG IA: CPT

## 2024-08-26 PROCEDURE — 87491 CHLMYD TRACH DNA AMP PROBE: CPT

## 2024-08-26 PROCEDURE — 86780 TREPONEMA PALLIDUM: CPT

## 2024-08-26 PROCEDURE — 86803 HEPATITIS C AB TEST: CPT

## 2024-08-26 RX ORDER — CLOTRIMAZOLE 1 %
1 CREAM (GRAM) TOPICAL 2 TIMES DAILY
Qty: 28 G | Refills: 0 | Status: SHIPPED | OUTPATIENT
Start: 2024-08-26

## 2024-08-26 ASSESSMENT — PATIENT HEALTH QUESTIONNAIRE - PHQ9: CLINICAL INTERPRETATION OF PHQ2 SCORE: 0

## 2024-08-26 NOTE — PROGRESS NOTES
Verbal consent was acquired by the patient to use [employer] ambient listening note generation during this visit: Yes      HPI:    Qamar Felix is a pleasant 18 y.o. male here to establish care.    History of Present Illness  The patient is a 18-year-old male who is here to establish care and to discuss lesions on his penis.    He noticed three small bumps on his penis yesterday, which are not painful or itchy. He does not have a history of cold sores and maintains hygiene by washing after sexual intercourse. He is sexually active, engages in sexual activity 2 to 3 times a week, and uses condoms intermittently.    He reports no known allergies to medications or foods and has no significant past medical history. He underwent surgery for a cyst in his left groin at the age of 2 or 5 months. He does not use electronic cigarettes or tobacco products but consumes alcohol once a week. He does not use recreational drugs, including marijuana. He is uncertain about his COVID-19 vaccination status. He has not had a medical check-up in the last 4 to 5 years.    SOCIAL HISTORY  He has 2 brothers and 2 sisters.    FAMILY HISTORY  His oldest brother has asthma. He denies any family history of heart disease, diabetes, or cancer.    IMMUNIZATIONS  He is up to date on his HPV and meningitis vaccines.    Current medicines (including changes today)  Current Outpatient Medications   Medication Sig Dispense Refill    clotrimazole (LOTRIMIN) 1 % Cream Apply 1 Application topically 2 times a day. 28 g 0     No current facility-administered medications for this visit.       Past Medical/ Surgical History  He  has no past medical history on file.  He  has a past surgical history that includes cyst excision (2003).    Social History  Social History     Tobacco Use    Smoking status: Never   Vaping Use    Vaping status: Never Used   Substance Use Topics    Alcohol use: Yes     Comment: 1 time a week    Drug use: Never     Social  "History     Social History Narrative    Not on file        Family History  Family History   Problem Relation Age of Onset    No Known Problems Mother     No Known Problems Father     No Known Problems Sister     No Known Problems Sister     No Known Problems Sister     No Known Problems Sister     Asthma Brother     No Known Problems Brother      Family Status   Relation Name Status    Mo  Alive    Fa  Alive    Sis  Alive    Sis  Alive    Sis 1/2 Alive    Sis 1/2 Alive    Bro  Alive    Bro  Alive   No partnership data on file        Objective:     /64   Pulse 60   Temp 36.8 °C (98.2 °F)   Ht 1.753 m (5' 9\")   Wt 86.2 kg (190 lb)   SpO2 96%  Body mass index is 28.06 kg/m².    Physical Exam  Constitutional:       General: He is not in acute distress.  HENT:      Head: Normocephalic and atraumatic.      Right Ear: Tympanic membrane and external ear normal.      Left Ear: Tympanic membrane and external ear normal.      Nose: No nasal deformity.      Mouth/Throat:      Lips: Pink.      Mouth: Mucous membranes are moist.      Pharynx: Oropharynx is clear. Uvula midline. No posterior oropharyngeal erythema.   Eyes:      General: Lids are normal.      Extraocular Movements: Extraocular movements intact.      Conjunctiva/sclera: Conjunctivae normal.      Pupils: Pupils are equal, round, and reactive to light.   Neck:      Trachea: Trachea normal.   Cardiovascular:      Rate and Rhythm: Normal rate and regular rhythm.      Heart sounds: Normal heart sounds. No murmur heard.     No friction rub. No gallop.   Pulmonary:      Effort: Pulmonary effort is normal. No accessory muscle usage.      Breath sounds: Normal breath sounds. No wheezing or rales.   Abdominal:      General: Bowel sounds are normal.      Palpations: Abdomen is soft.      Tenderness: There is no abdominal tenderness.   Genitourinary:     Penis: Uncircumcised. Erythema (Dry skin flaking circumferential around the penis just below the glans.) present.  "   Musculoskeletal:      Cervical back: Normal range of motion and neck supple.      Right lower leg: No edema.      Left lower leg: No edema.   Lymphadenopathy:      Cervical: No cervical adenopathy.   Skin:     General: Skin is warm and dry.      Findings: No rash.   Neurological:      General: No focal deficit present.      Mental Status: He is alert and oriented to person, place, and time. Mental status is at baseline.      GCS: GCS eye subscore is 4. GCS verbal subscore is 5. GCS motor subscore is 6.      Motor: No weakness.      Gait: Gait is intact.   Psychiatric:         Attention and Perception: Attention normal.         Mood and Affect: Mood and affect normal.         Speech: Speech normal.          Imaging:  None    Labs:  None  Assessment and Plan:   The following treatment plan was discussed:     Assessment & Plan  1. Balanitis.  This is an acute condition. Physical exam showed some mild erythema with some skin peeling consistent with an acute infection. A prescription for clotrimazole 1% cream will be sent to his preferred pharmacy to apply it twice daily to the affected area. Hygiene for an uncircumcised penis was discussed to avoid future infections. He is recommended to wash after every sexual encounter and ensure everything is dry before retracting the foreskin back over.    2. STD Screening.  An STD panel will be conducted to check for syphilis, HIV, hepatitis C, HSV 1 and 2, chlamydia, gonorrhea, and trichomoniasis. A urine sample will be collected today, and additional blood tests will be performed.    Follow-up  The patient will follow up in 1 year for an annual wellness visit.  Qamar was seen today for establish care and rash.    Diagnoses and all orders for this visit:    Encounter for medical examination to establish care    Balanitis  -     Chlamydia/GC, PCR (Urine); Future  -     HIV AG/AB COMBO ASSAY SCREENING; Future  -     RPR (SYPHILIS); Future  -     HSV 1/2 IGG W/ TYPE SPECIFIC RFLX;  Future  -     TRICHOMONAS VAGINALIS BY TMA; Future  -     clotrimazole (LOTRIMIN) 1 % Cream; Apply 1 Application topically 2 times a day.    Encounter for hepatitis C screening test for low risk patient  -     HEP C VIRUS ANTIBODY; Future    Screening examination for STD (sexually transmitted disease)  -     Chlamydia/GC, PCR (Urine); Future  -     HIV AG/AB COMBO ASSAY SCREENING; Future  -     RPR (SYPHILIS); Future  -     HSV 1/2 IGG W/ TYPE SPECIFIC RFLX; Future  -     TRICHOMONAS VAGINALIS BY TMA; Future  -     HEP C VIRUS ANTIBODY; Future      Followup: Return in about 1 year (around 8/26/2025) for Annual.    I have placed urine orders.  The MA is preforming urine orders under the direction of Dr. Bonilla      Please note that this dictation was created using voice recognition software. I have made every reasonable attempt to correct obvious errors, but I expect that there are errors of grammar and possibly content that I did not discover before finalizing the note.

## 2024-08-27 LAB
C TRACH DNA SPEC QL NAA+PROBE: NEGATIVE
HCV AB SER QL: NORMAL
HIV 1+2 AB+HIV1 P24 AG SERPL QL IA: NORMAL
N GONORRHOEA DNA SPEC QL NAA+PROBE: NEGATIVE
SPECIMEN SOURCE: NORMAL
T PALLIDUM AB SER QL IA: NORMAL

## 2024-08-28 LAB
SPEC CONTAINER SPEC: NORMAL
SPECIMEN SOURCE: NORMAL
T VAGINALIS RRNA SPEC QL NAA+PROBE: NEGATIVE

## 2024-08-29 LAB — HSV1+2 IGG SER IA-ACNC: 0.42 IV
